# Patient Record
Sex: FEMALE | Race: WHITE | NOT HISPANIC OR LATINO
[De-identification: names, ages, dates, MRNs, and addresses within clinical notes are randomized per-mention and may not be internally consistent; named-entity substitution may affect disease eponyms.]

---

## 2023-02-24 ENCOUNTER — APPOINTMENT (OUTPATIENT)
Dept: ORTHOPEDIC SURGERY | Facility: CLINIC | Age: 23
End: 2023-02-24
Payer: MEDICAID

## 2023-02-24 VITALS — HEIGHT: 64 IN | BODY MASS INDEX: 20.7 KG/M2 | WEIGHT: 121.25 LBS

## 2023-02-24 DIAGNOSIS — Z78.9 OTHER SPECIFIED HEALTH STATUS: ICD-10-CM

## 2023-02-24 PROBLEM — Z00.00 ENCOUNTER FOR PREVENTIVE HEALTH EXAMINATION: Status: ACTIVE | Noted: 2023-02-24

## 2023-02-24 PROCEDURE — 99204 OFFICE O/P NEW MOD 45 MIN: CPT

## 2023-02-24 PROCEDURE — 72170 X-RAY EXAM OF PELVIS: CPT

## 2023-02-24 PROCEDURE — 72110 X-RAY EXAM L-2 SPINE 4/>VWS: CPT

## 2023-02-24 RX ORDER — IBUPROFEN 600 MG/1
600 TABLET, FILM COATED ORAL 3 TIMES DAILY
Qty: 60 | Refills: 1 | Status: ACTIVE | COMMUNITY
Start: 2023-02-24 | End: 1900-01-01

## 2023-02-24 RX ORDER — GABAPENTIN 100 MG/1
100 CAPSULE ORAL
Qty: 60 | Refills: 3 | Status: ACTIVE | COMMUNITY
Start: 2023-02-24 | End: 1900-01-01

## 2023-02-24 NOTE — IMAGING
[de-identified] : LSPINE\par Inspection: No rash or ecchymosis\par Palpation: L SIJ TTP\par ROM: diminished all planes\par Strength: 5/5 bilateral hip flexors, knee extensors, ankle dorsiflexors, EHL, ankle plantarflexors\par Sensation: Sensation present to light touch bilateral L2-S1 distributions\par Provocative maneuvers: Negative RIGHT straight leg raise. + L SLR\par \par Bilateral hips-\par Palpation: No tenderness to palpation over greater trochanter or IT band\par ROM: No pain with flexion and internal rotation  [Scoliosis] : Scoliosis [AP] : anteroposterior [There are no fractures, subluxations or dislocations. No significant abnormalities are seen] : There are no fractures, subluxations or dislocations. No significant abnormalities are seen

## 2023-02-24 NOTE — HISTORY OF PRESENT ILLNESS
[Lower back] : lower back [Sudden] : sudden [0] : 0 [7] : 7 [Radiating] : radiating [Sharp] : sharp [Frequent] : frequent [Meds] : meds [Heat] : heat [Sitting] : sitting [de-identified] : 2/24/23-  GILBERT FELIX  a 22 year old female who comes in today complaining of lower back pain for four months with no injury.  Started working as a , has noticed symptoms since starting work. Radiates down the LLE to the plantar foot. A/w N/T. No bb dysfunction. Put on nsaids and went to PT guided by PCP, no lasting relief.  [] : no [FreeTextEntry7] : left leg

## 2023-02-24 NOTE — ASSESSMENT
[FreeTextEntry1] : LLE radic in setting of levoconvex scoli\par Indicated for lumbar MRI to eval for cord abnormality given this is not a typical curve and eval for HNP\par Patient has failed 6 weeks of conservative care, including physical therapy and medications. MRI will also be used to guide potential future injections/surgical management. \par \par Gabapentin- Patient advised of sedating effects, instructed not to drive, operate machinery, or take with other sedating medications. Advised of need to taper on/off medication and risk of abruptly stopping gabapentin.\par \par NSAIDs- Patient warned of risk of medication to GI tract, increased blood pressure, cardiac risk, and risk of fluid retention.  Advised to clear medication with internist or PCP if any concurrent health problem with heart, blood pressure, or GI system exists.\par

## 2023-03-03 ENCOUNTER — APPOINTMENT (OUTPATIENT)
Dept: MRI IMAGING | Facility: CLINIC | Age: 23
End: 2023-03-03

## 2023-03-15 ENCOUNTER — APPOINTMENT (OUTPATIENT)
Dept: ORTHOPEDIC SURGERY | Facility: CLINIC | Age: 23
End: 2023-03-15
Payer: MEDICAID

## 2023-03-15 VITALS — HEIGHT: 64 IN | WEIGHT: 121 LBS | BODY MASS INDEX: 20.66 KG/M2

## 2023-03-15 DIAGNOSIS — M54.16 RADICULOPATHY, LUMBAR REGION: ICD-10-CM

## 2023-03-15 DIAGNOSIS — M41.126 ADOLESCENT IDIOPATHIC SCOLIOSIS, LUMBAR REGION: ICD-10-CM

## 2023-03-15 PROCEDURE — 99213 OFFICE O/P EST LOW 20 MIN: CPT

## 2023-03-15 NOTE — ASSESSMENT
[FreeTextEntry1] : LLE radic in setting of levoconvex scoli\par Indicated for lumbar MRI to eval for spinal cord abnormality. This lumbar curve is not a typical curve and needs to be investigated. Will eval for HNP as well. \par \par Patient has failed 6 weeks of conservative care, including physical therapy and gabapentin. MRI will  be used to guide potential future injections/surgical management. \par \par Gabapentin- Patient advised of sedating effects, instructed not to drive, operate machinery, or take with other sedating medications. Advised of need to taper on/off medication and risk of abruptly stopping gabapentin.\par \par NSAIDs- Patient warned of risk of medication to GI tract, increased blood pressure, cardiac risk, and risk of fluid retention.  Advised to clear medication with internist or PCP if any concurrent health problem with heart, blood pressure, or GI system exists.\par \par Patient seen by Clemencia Cassidy PA-C, with and under the supervision of  Dr. Maximus Gonzales M.D.\par

## 2023-03-15 NOTE — HISTORY OF PRESENT ILLNESS
[Lower back] : lower back [Sudden] : sudden [0] : 0 [7] : 7 [Radiating] : radiating [Sharp] : sharp [Frequent] : frequent [Meds] : meds [Heat] : heat [Sitting] : sitting [de-identified] : 2/24/23-  GILBERT WASHINGTON  a 22 year old female who comes in today complaining of lower back pain for four months with no injury.  Started working as a , has noticed symptoms since starting work. Radiates down the LLE to the plantar foot. A/w N/T. No bb dysfunction. Put on nsaids and went to PT guided by PCP, no lasting relief. \par \par 3/15/23- MRI denied. Continued lower back pain with radiation down LLE. No relief with gabapentin prescribed last visit.  [] : no [FreeTextEntry5] : Pt's MRI was denied by ins company. Needs new script today to be resubmitted. No changes in pain or symptoms since last visit.  [FreeTextEntry7] : left leg

## 2023-03-15 NOTE — IMAGING
[Scoliosis] : Scoliosis [AP] : anteroposterior [There are no fractures, subluxations or dislocations. No significant abnormalities are seen] : There are no fractures, subluxations or dislocations. No significant abnormalities are seen [de-identified] : LSPINE\par Inspection: No rash or ecchymosis\par Palpation: L SIJ TTP\par ROM: diminished all planes\par Strength: 5/5 bilateral hip flexors, knee extensors, ankle dorsiflexors, EHL, ankle plantarflexors\par Sensation: Sensation present to light touch bilateral L2-S1 distributions\par Provocative maneuvers: Negative RIGHT straight leg raise. + L SLR\par \par Bilateral hips-\par Palpation: No tenderness to palpation over greater trochanter or IT band\par ROM: No pain with flexion and internal rotation

## 2023-03-27 ENCOUNTER — APPOINTMENT (OUTPATIENT)
Dept: MRI IMAGING | Facility: CLINIC | Age: 23
End: 2023-03-27

## 2023-04-19 ENCOUNTER — APPOINTMENT (OUTPATIENT)
Dept: ORTHOPEDIC SURGERY | Facility: CLINIC | Age: 23
End: 2023-04-19